# Patient Record
Sex: FEMALE | Race: WHITE | NOT HISPANIC OR LATINO | Employment: OTHER | URBAN - METROPOLITAN AREA
[De-identification: names, ages, dates, MRNs, and addresses within clinical notes are randomized per-mention and may not be internally consistent; named-entity substitution may affect disease eponyms.]

---

## 2020-12-14 NOTE — PATIENT DISCUSSION
WILL GET PATIENTS RESULTS FROM DR. KEYS FROM HIS RECENT 30-2 (PATIENT STATES HE HAS HAD 4 STROKES) **WILL NEED CLEARANCE FROM PCP FOR SURGERY.

## 2022-05-26 ENCOUNTER — NEW PATIENT (OUTPATIENT)
Dept: URBAN - METROPOLITAN AREA CLINIC 47 | Facility: CLINIC | Age: 72
End: 2022-05-26

## 2022-05-26 DIAGNOSIS — Z96.1: ICD-10-CM

## 2022-05-26 DIAGNOSIS — H43.813: ICD-10-CM

## 2022-05-26 DIAGNOSIS — H52.7: ICD-10-CM

## 2022-05-26 DIAGNOSIS — H26.493: ICD-10-CM

## 2022-05-26 PROCEDURE — 92004 COMPRE OPH EXAM NEW PT 1/>: CPT

## 2022-05-26 PROCEDURE — 92015 DETERMINE REFRACTIVE STATE: CPT

## 2022-05-26 ASSESSMENT — VISUAL ACUITY
OD_CC: J3
OS_SC: <J12
OS_CC: 20/25
OS_SC: 20/30+
OD_SC: <J12
OD_CC: 20/25
OD_SC: 20/30
OS_CC: J3-

## 2022-05-26 ASSESSMENT — TONOMETRY
OS_IOP_MMHG: 12
OD_IOP_MMHG: 12

## 2022-06-07 ENCOUNTER — CONSULTATION/EVALUATION (OUTPATIENT)
Dept: URBAN - METROPOLITAN AREA CLINIC 46 | Facility: CLINIC | Age: 72
End: 2022-06-07

## 2022-06-07 DIAGNOSIS — H35.033: ICD-10-CM

## 2022-06-07 DIAGNOSIS — H43.813: ICD-10-CM

## 2022-06-07 DIAGNOSIS — H26.493: ICD-10-CM

## 2022-06-07 DIAGNOSIS — H35.09: ICD-10-CM

## 2022-06-07 DIAGNOSIS — H04.123: ICD-10-CM

## 2022-06-07 PROCEDURE — 92250 FUNDUS PHOTOGRAPHY W/I&R: CPT

## 2022-06-07 PROCEDURE — 99214 OFFICE O/P EST MOD 30 MIN: CPT

## 2022-06-07 ASSESSMENT — VISUAL ACUITY
OD_CC: 20/25
OS_CC: 20/25

## 2022-06-07 ASSESSMENT — TONOMETRY
OS_IOP_MMHG: 12
OD_IOP_MMHG: 12

## 2022-06-14 ENCOUNTER — SURGERY/PROCEDURE (OUTPATIENT)
Dept: URBAN - METROPOLITAN AREA SURGERY 14 | Facility: SURGERY | Age: 72
End: 2022-06-14

## 2022-06-14 ENCOUNTER — ESTABLISHED PATIENT (OUTPATIENT)
Dept: URBAN - METROPOLITAN AREA CLINIC 39 | Facility: CLINIC | Age: 72
End: 2022-06-14

## 2022-06-14 DIAGNOSIS — H26.491: ICD-10-CM

## 2022-06-14 DIAGNOSIS — H26.493: ICD-10-CM

## 2022-06-14 PROCEDURE — 66821 AFTER CATARACT LASER SURGERY: CPT

## 2022-06-14 PROCEDURE — 92014 COMPRE OPH EXAM EST PT 1/>: CPT

## 2022-06-14 ASSESSMENT — VISUAL ACUITY
OS_BAT: 20/50
OS_SC: 20/25
OD_SC: 20/25-1
OD_BAT: 20/60

## 2022-06-14 ASSESSMENT — TONOMETRY
OD_IOP_MMHG: 12
OS_IOP_MMHG: 12

## 2022-06-14 NOTE — PROCEDURE NOTE: SURGICAL
<p>Prior to commencing surgery patient identification, surgical procedure, site, and side were confirmed by Dr. Hansa Maradiaga. Following topical proparacaine anesthesia, the patient was positioned at the YAG laser, a contact lens coupled to the cornea with methylcellulose and an axial posterior capsulotomy performed without complication using 3.9 Mj x 32. Excess methylcellulose was washed from the eye, one drop of Alphagan was instilled and the patient returned to the holding area having tolerated the procedure well and without complication. </p><p>MRN: [id number]</p>

## 2022-06-21 ENCOUNTER — POST-OP (OUTPATIENT)
Dept: URBAN - METROPOLITAN AREA CLINIC 47 | Facility: CLINIC | Age: 72
End: 2022-06-21

## 2022-06-21 DIAGNOSIS — H43.813: ICD-10-CM

## 2022-06-21 DIAGNOSIS — Z98.890: ICD-10-CM

## 2022-06-21 PROCEDURE — 99024 POSTOP FOLLOW-UP VISIT: CPT

## 2022-06-21 ASSESSMENT — TONOMETRY
OD_IOP_MMHG: 16
OS_IOP_MMHG: 16

## 2022-06-21 ASSESSMENT — VISUAL ACUITY
OD_SC: 20/25
OU_SC: 20/20
OS_SC: 20/25

## 2023-06-15 ENCOUNTER — COMPREHENSIVE EXAM (OUTPATIENT)
Dept: URBAN - METROPOLITAN AREA CLINIC 47 | Facility: CLINIC | Age: 73
End: 2023-06-15

## 2023-06-15 DIAGNOSIS — H04.123: ICD-10-CM

## 2023-06-15 DIAGNOSIS — Z96.1: ICD-10-CM

## 2023-06-15 DIAGNOSIS — H35.363: ICD-10-CM

## 2023-06-15 DIAGNOSIS — H35.30: ICD-10-CM

## 2023-06-15 DIAGNOSIS — H43.813: ICD-10-CM

## 2023-06-15 DIAGNOSIS — H52.7: ICD-10-CM

## 2023-06-15 DIAGNOSIS — H35.3131: ICD-10-CM

## 2023-06-15 DIAGNOSIS — H35.033: ICD-10-CM

## 2023-06-15 PROCEDURE — 92014 COMPRE OPH EXAM EST PT 1/>: CPT

## 2023-06-15 PROCEDURE — 92134 CPTRZ OPH DX IMG PST SGM RTA: CPT

## 2023-06-15 PROCEDURE — 92015 DETERMINE REFRACTIVE STATE: CPT

## 2023-06-15 ASSESSMENT — TONOMETRY
OS_IOP_MMHG: 12
OD_IOP_MMHG: 12

## 2023-06-15 ASSESSMENT — VISUAL ACUITY
OS_SC: 20/25
OD_CC: J4-
OS_CC: J8
OD_CC: 20/30
OS_CC: 20/25
OU_SC: 20/20
OU_CC: 20/20
OU_CC: J4
OD_SC: 20/25

## 2024-07-18 ENCOUNTER — COMPREHENSIVE EXAM (OUTPATIENT)
Dept: URBAN - METROPOLITAN AREA CLINIC 47 | Facility: CLINIC | Age: 74
End: 2024-07-18

## 2024-07-18 DIAGNOSIS — Z96.1: ICD-10-CM

## 2024-07-18 DIAGNOSIS — H04.123: ICD-10-CM

## 2024-07-18 DIAGNOSIS — H35.033: ICD-10-CM

## 2024-07-18 DIAGNOSIS — H52.7: ICD-10-CM

## 2024-07-18 DIAGNOSIS — H35.363: ICD-10-CM

## 2024-07-18 DIAGNOSIS — H35.30: ICD-10-CM

## 2024-07-18 DIAGNOSIS — H43.813: ICD-10-CM

## 2024-07-18 PROCEDURE — 92015 DETERMINE REFRACTIVE STATE: CPT

## 2024-07-18 PROCEDURE — 99214 OFFICE O/P EST MOD 30 MIN: CPT

## 2024-07-18 ASSESSMENT — VISUAL ACUITY
OU_CC: J6
OU_SC: 20/25
OD_CC: 20/30
OD_CC: J6
OS_SC: 20/30
OD_SC: 20/25
OS_CC: J8
OS_CC: 20/25
OU_CC: 20/25

## 2024-07-18 ASSESSMENT — TONOMETRY
OS_IOP_MMHG: 14
OD_IOP_MMHG: 14